# Patient Record
(demographics unavailable — no encounter records)

---

## 2019-06-21 NOTE — EKG
[place] [address]
Phone:  [phone]                     ELECTROCARDIOGRAM REPORT      
_______________________________________________________________________________
 
Name:       ANTOINE UPTON            Room:                      [facility] [place]#:  [id number]      Account #:      [id number]  
Admission:  19     Attend Phys:                         
Discharge:  19     Date of Birth:  60  
         Report #: [id number]
    [id number]
_______________________________________________________________________________
THIS REPORT FOR:  //name//                      
 
                         [facility] ED
                                       
Test Date:    2019               Test Time:    03:22:22
Pat Name:     ANTOINE REYES        Department:   
Patient ID:   [id number]            Room:          
Gender:       F                        Technician:   NESTOR
:          1960               Requested By: Nathan Riggs
Order Number: [id number] MD:   Suhas Vázquez
                                 Measurements
Intervals                              Axis          
Rate:         103                      P:            70
MS:           154                      QRS:          65
QRSD:         85                       T:            55
QT:           328                                    
QTc:          430                                    
                           Interpretive Statements
Sinus tachycardia
Low voltage, precordial leads
Baseline wander in lead(s) II,aVR,aVF
No previous ECG available for comparison
 
Electronically Signed On 2019 14:09:21 CDT by Suhas Vázquez
[email or web address]
 
 
 
 
 
 
 
 
 
 
 
 
 
 
 
 
 
 
  <ELECTRONICALLY SIGNED>
                                           By: Suhas Vázquez MD, [facility]      
  19     1409
D: 19 0322   _____________________________________
T: 19 0322   Suhas Vázquez MD, FAC        /EPI